# Patient Record
Sex: MALE | Race: WHITE | NOT HISPANIC OR LATINO | URBAN - METROPOLITAN AREA
[De-identification: names, ages, dates, MRNs, and addresses within clinical notes are randomized per-mention and may not be internally consistent; named-entity substitution may affect disease eponyms.]

---

## 2018-06-25 NOTE — PATIENT DISCUSSION
[facility] PLAQUE OS AFTER YARD MATERIAL FB SITTING IN EYE FOR 4 DAYS. NO FB SEEN TODAY. WILL PRESCRIBE BESIVANCE OS 6 TIMES A DAY AND CONTINUE ERYTHROMYCIN OINTMENT AT BEDTIME. SEND TO [facility] FOR CONCERN OF FUNGAL INFECTION.

## 2018-06-25 NOTE — PATIENT DISCUSSION
New Prescription: Besivance (besifloxacin): drops,suspension: 0.6% 1 drop as directed into left eye 06-

## 2018-07-09 ENCOUNTER — IMPORTED ENCOUNTER (OUTPATIENT)
Dept: URBAN - METROPOLITAN AREA CLINIC 50 | Facility: CLINIC | Age: 73
End: 2018-07-09

## 2021-04-07 ENCOUNTER — IMPORTED ENCOUNTER (OUTPATIENT)
Dept: URBAN - METROPOLITAN AREA CLINIC 50 | Facility: CLINIC | Age: 76
End: 2021-04-07

## 2021-04-07 ENCOUNTER — PREPPED CHART (OUTPATIENT)
Dept: URBAN - METROPOLITAN AREA CLINIC 49 | Facility: CLINIC | Age: 76
End: 2021-04-07

## 2021-04-07 NOTE — PATIENT DISCUSSION
Dermatochalasis RUL bothersome to patient. Recommend ptosis visual field and photos at patient's convenience for possible blepharoplasty. Patient will be contacted with results. + ASA 81mg, can stop on his own. Routing sheet on file.

## 2021-04-07 NOTE — PATIENT DISCUSSION
Informed patient that their cataract is visually significant and meets the criteria for cataract surgery to increase their vision and decrease their glare symptoms. RBALs of surgery discussed, questions answered but prefers to wait to schedule surgery.

## 2021-04-16 ASSESSMENT — VISUAL ACUITY
OS_SC: 20/30+2
OD_GLARE: 20/100
OS_GLARE: >20/400
OD_SC: 20/20-3
OU_CC: J1+
OD_GLARE: 20/400

## 2021-04-16 ASSESSMENT — TONOMETRY
OS_IOP_MMHG: 14
OD_IOP_MMHG: 14

## 2021-04-17 ASSESSMENT — VISUAL ACUITY
OS_CC: J1+(-1)
OS_OTHER: >20/400.
OD_CC: J1+(-1)
OD_BAT: 20/100
OS_SC: 20/30+2
OD_BAT: 20/60
OS_CC: J1@ 16 IN
OS_BAT: 20/200
OD_OTHER: 20/100. 20/400.
OS_PH: 20/25-2
OS_PH: 20/25
OS_OTHER: 20/200.
OS_SC: 20/40
OD_CC: J1@ 16 IN
OS_BAT: >20/400
OD_SC: 20/20-3
OD_SC: 20/20-2
OD_OTHER: 20/60.

## 2021-04-17 ASSESSMENT — TONOMETRY
OD_IOP_MMHG: 16
OS_IOP_MMHG: 14
OS_IOP_MMHG: 14
OD_IOP_MMHG: 14

## 2021-04-20 ENCOUNTER — PREPPED CHART (OUTPATIENT)
Dept: URBAN - METROPOLITAN AREA CLINIC 49 | Facility: CLINIC | Age: 76
End: 2021-04-20

## 2021-05-04 ENCOUNTER — VISUAL FIELD BLEPHAROPLASTY (OUTPATIENT)
Dept: URBAN - METROPOLITAN AREA CLINIC 49 | Facility: CLINIC | Age: 76
End: 2021-05-04

## 2021-05-04 DIAGNOSIS — H02.834: ICD-10-CM

## 2021-05-04 DIAGNOSIS — H02.831: ICD-10-CM

## 2021-05-04 PROCEDURE — 92082 INTERMEDIATE VISUAL FIELD XM: CPT

## 2021-05-04 PROCEDURE — 92285 EXTERNAL OCULAR PHOTOGRAPHY: CPT

## 2021-06-09 ENCOUNTER — BLEPH PRE-OP (OUTPATIENT)
Dept: URBAN - METROPOLITAN AREA CLINIC 49 | Facility: CLINIC | Age: 76
End: 2021-06-09

## 2021-06-09 VITALS — HEIGHT: 60 IN | DIASTOLIC BLOOD PRESSURE: 91 MMHG | SYSTOLIC BLOOD PRESSURE: 148 MMHG

## 2021-06-09 DIAGNOSIS — H02.831: ICD-10-CM

## 2021-06-09 DIAGNOSIS — H02.834: ICD-10-CM

## 2021-06-09 PROCEDURE — PREOP PRE OP VISIT

## 2021-06-09 ASSESSMENT — VISUAL ACUITY
OD_SC: 20/25-1
OS_SC: 20/25-2

## 2021-06-15 ENCOUNTER — SURGERY/PROCEDURE (OUTPATIENT)
Dept: URBAN - METROPOLITAN AREA SURGERY 16 | Facility: SURGERY | Age: 76
End: 2021-06-15

## 2021-06-15 DIAGNOSIS — H02.834: ICD-10-CM

## 2021-06-15 DIAGNOSIS — H02.831: ICD-10-CM

## 2021-06-15 PROCEDURE — 15823 BLEPHARP UPR EYELID XCSV SKN: CPT

## 2021-06-15 RX ORDER — ERYTHROMYCIN 5 MG/G: 1/2 OINTMENT OPHTHALMIC

## 2021-06-23 ENCOUNTER — 1 WEEK POST-OP (OUTPATIENT)
Dept: URBAN - METROPOLITAN AREA CLINIC 49 | Facility: CLINIC | Age: 76
End: 2021-06-23

## 2021-06-23 DIAGNOSIS — Z98.890: ICD-10-CM

## 2021-06-23 PROCEDURE — 99024 POSTOP FOLLOW-UP VISIT: CPT

## 2021-06-23 ASSESSMENT — VISUAL ACUITY
OD_SC: 20/25
OS_SC: 20/30+2

## 2021-07-19 ENCOUNTER — 4 WEEK PO - BLEPHAROPLASTY (OUTPATIENT)
Dept: URBAN - METROPOLITAN AREA CLINIC 49 | Facility: CLINIC | Age: 76
End: 2021-07-19

## 2021-07-19 DIAGNOSIS — Z98.890: ICD-10-CM

## 2021-07-19 PROCEDURE — 99024 POSTOP FOLLOW-UP VISIT: CPT

## 2021-07-19 PROCEDURE — 92285 EXTERNAL OCULAR PHOTOGRAPHY: CPT

## 2021-07-19 ASSESSMENT — VISUAL ACUITY
OD_SC: 20/25
OS_SC: 20/25

## 2021-11-30 ENCOUNTER — CATARACT EVALUATION (OUTPATIENT)
Dept: URBAN - METROPOLITAN AREA CLINIC 49 | Facility: CLINIC | Age: 76
End: 2021-11-30

## 2021-11-30 DIAGNOSIS — H25.813: ICD-10-CM

## 2021-11-30 PROCEDURE — 92014 COMPRE OPH EXAM EST PT 1/>: CPT

## 2021-11-30 ASSESSMENT — TONOMETRY
OD_IOP_MMHG: 14
OS_IOP_MMHG: 14

## 2021-11-30 ASSESSMENT — VISUAL ACUITY
OU_CC: J1+-2
OS_GLARE: 20/400
OS_GLARE: 20/80
OD_GLARE: 20/100
OD_GLARE: 20/60
OS_SC: 20/30
OD_SC: 20/25

## 2021-11-30 NOTE — PATIENT DISCUSSION
VISUALLY SIGNIFICANT: Informed patient that their cataract is visually significant and that surgery is recommended to improve patient's visual acuity and/or glare symptoms. RBAs of surgery discussed and questions answered. Patient to schedule biometry measurements and surgery. Recommend OS then OD.

## 2021-12-17 ENCOUNTER — DIAGNOSTICS ONLY (OUTPATIENT)
Dept: URBAN - METROPOLITAN AREA CLINIC 49 | Facility: CLINIC | Age: 76
End: 2021-12-17

## 2021-12-17 DIAGNOSIS — H25.813: ICD-10-CM

## 2021-12-17 DIAGNOSIS — H25.13: ICD-10-CM

## 2021-12-17 PROCEDURE — 92136 OPHTHALMIC BIOMETRY: CPT

## 2021-12-17 PROCEDURE — 92025IOL CORNEAL TOPOGRAPHY PREMIUM IOL

## 2021-12-17 ASSESSMENT — KERATOMETRY
OS_AXISANGLE2_DEGREES: 62
OS_K2POWER_DIOPTERS: 44.62
OS_AXISANGLE_DEGREES: 152
OS_K1POWER_DIOPTERS: 45.50
OD_K2POWER_DIOPTERS: 45.25
OD_AXISANGLE_DEGREES: 50
OD_K1POWER_DIOPTERS: 45.87
OD_AXISANGLE2_DEGREES: 140

## 2021-12-21 ENCOUNTER — PRE-OP/H&P (OUTPATIENT)
Dept: URBAN - METROPOLITAN AREA CLINIC 49 | Facility: CLINIC | Age: 76
End: 2021-12-21

## 2021-12-21 VITALS — SYSTOLIC BLOOD PRESSURE: 143 MMHG | DIASTOLIC BLOOD PRESSURE: 90 MMHG | HEIGHT: 60 IN | HEART RATE: 62 BPM

## 2021-12-21 DIAGNOSIS — H43.393: ICD-10-CM

## 2021-12-21 DIAGNOSIS — H25.812: ICD-10-CM

## 2021-12-21 PROCEDURE — 92134 CPTRZ OPH DX IMG PST SGM RTA: CPT

## 2021-12-21 PROCEDURE — PREOP PRE OP VISIT

## 2021-12-21 ASSESSMENT — KERATOMETRY
OD_K2POWER_DIOPTERS: 45.25
OD_K1POWER_DIOPTERS: 45.87
OS_K1POWER_DIOPTERS: 45.50
OS_K2POWER_DIOPTERS: 44.62
OS_AXISANGLE_DEGREES: 152
OD_AXISANGLE2_DEGREES: 140
OD_AXISANGLE_DEGREES: 50
OS_AXISANGLE2_DEGREES: 62

## 2021-12-21 ASSESSMENT — TONOMETRY
OS_IOP_MMHG: 14
OD_IOP_MMHG: 14

## 2021-12-21 ASSESSMENT — VISUAL ACUITY
OD_SC: 20/30
OS_SC: 20/30

## 2021-12-21 NOTE — PATIENT DISCUSSION
CATARACT SURGERY PLANNER - STANDARD IOL/+FEMTO: Phacoemulsification with IOL: Eye: OS|DOS: 1/6/2022|Model: DIBOO|Power: 20. 5|Target: PLANO|Femto: YES|Arcs: 44° @ 150° ; 44° @ 330°|Visc: DUET|Omidria: YES|10% Phenylephrine: YES|Epi-shugarcaine: YES|Phaco Setting: STD|Pupilloplasty: +/-|Notes: PLAN: EYHANCE @ [facility] OU. HX: NO MAC PATHOLOGY. DILATED PUPIL: 5MM.

## 2022-01-06 ENCOUNTER — POST-OP (OUTPATIENT)
Dept: URBAN - METROPOLITAN AREA CLINIC 48 | Facility: CLINIC | Age: 77
End: 2022-01-06

## 2022-01-06 ENCOUNTER — SURGERY/PROCEDURE (OUTPATIENT)
Dept: URBAN - METROPOLITAN AREA SURGERY 16 | Facility: SURGERY | Age: 77
End: 2022-01-06

## 2022-01-06 DIAGNOSIS — Z98.42: ICD-10-CM

## 2022-01-06 DIAGNOSIS — H25.812: ICD-10-CM

## 2022-01-06 PROCEDURE — 99024 POSTOP FOLLOW-UP VISIT: CPT

## 2022-01-06 PROCEDURE — 66984 XCAPSL CTRC RMVL W/O ECP: CPT

## 2022-01-06 ASSESSMENT — KERATOMETRY
OD_AXISANGLE_DEGREES: 50
OS_AXISANGLE2_DEGREES: 62
OS_K1POWER_DIOPTERS: 45.50
OD_K2POWER_DIOPTERS: 45.25
OS_AXISANGLE_DEGREES: 152
OD_K1POWER_DIOPTERS: 45.87
OS_AXISANGLE_DEGREES: 152
OD_AXISANGLE2_DEGREES: 140
OD_AXISANGLE_DEGREES: 50
OS_K2POWER_DIOPTERS: 44.62
OS_K2POWER_DIOPTERS: 44.62
OD_AXISANGLE2_DEGREES: 140
OD_K2POWER_DIOPTERS: 45.25
OS_AXISANGLE2_DEGREES: 62
OD_K1POWER_DIOPTERS: 45.87
OS_K1POWER_DIOPTERS: 45.50

## 2022-01-06 ASSESSMENT — VISUAL ACUITY: OS_SC: 20/50

## 2022-01-06 ASSESSMENT — TONOMETRY: OS_IOP_MMHG: 22

## 2022-01-11 ENCOUNTER — POST-OP (OUTPATIENT)
Dept: URBAN - METROPOLITAN AREA CLINIC 49 | Facility: CLINIC | Age: 77
End: 2022-01-11

## 2022-01-11 DIAGNOSIS — H25.811: ICD-10-CM

## 2022-01-11 DIAGNOSIS — Z98.42: ICD-10-CM

## 2022-01-11 PROCEDURE — 99024 POSTOP FOLLOW-UP VISIT: CPT

## 2022-01-11 ASSESSMENT — TONOMETRY
OD_IOP_MMHG: 11
OS_IOP_MMHG: 11

## 2022-01-11 ASSESSMENT — VISUAL ACUITY
OS_SC: J5@17"
OS_SC: 20/20-2
OD_SC: 20/25
OS_SC: J5@26"

## 2022-07-25 ENCOUNTER — COMPREHENSIVE EXAM (OUTPATIENT)
Dept: URBAN - METROPOLITAN AREA CLINIC 49 | Facility: CLINIC | Age: 77
End: 2022-07-25

## 2022-07-25 DIAGNOSIS — H25.811: ICD-10-CM

## 2022-07-25 DIAGNOSIS — Z96.1: ICD-10-CM

## 2022-07-25 DIAGNOSIS — H43.393: ICD-10-CM

## 2022-07-25 DIAGNOSIS — H26.492: ICD-10-CM

## 2022-07-25 PROCEDURE — 92014 COMPRE OPH EXAM EST PT 1/>: CPT

## 2022-07-25 ASSESSMENT — VISUAL ACUITY
OS_GLARE: 20/40-1
OS_SC: 20/50-1
OU_SC: J1
OD_GLARE: 20/25-1
OS_SC: 20/25-2
OD_SC: 20/30-1/+1
OS_SC: J3-1
OS_GLARE: 20/30-1
OD_GLARE: 20/25-1

## 2022-07-25 ASSESSMENT — TONOMETRY
OS_IOP_MMHG: 11
OD_IOP_MMHG: 11

## 2022-07-25 NOTE — PATIENT DISCUSSION
Encouraged patient to eat dark leafy green vegetables and egg yolks to help prevent against macular degeneration.

## 2023-07-31 ENCOUNTER — COMPREHENSIVE EXAM (OUTPATIENT)
Dept: URBAN - METROPOLITAN AREA CLINIC 49 | Facility: CLINIC | Age: 78
End: 2023-07-31

## 2023-07-31 DIAGNOSIS — H52.4: ICD-10-CM

## 2023-07-31 DIAGNOSIS — Z01.01: ICD-10-CM

## 2023-07-31 PROCEDURE — 92015 DETERMINE REFRACTIVE STATE: CPT

## 2023-07-31 PROCEDURE — 92014 COMPRE OPH EXAM EST PT 1/>: CPT

## 2023-07-31 ASSESSMENT — VISUAL ACUITY
OD_GLARE: 20/40
OD_SC: 20/20-1
OS_SC: 20/30-2/+2
OS_PH: 20/30+1
OS_GLARE: 20/25-1
OU_SC: J1 @ 15IN
OD_GLARE: 20/40
OS_GLARE: 20/25-1

## 2023-07-31 ASSESSMENT — TONOMETRY
OS_IOP_MMHG: 11
OD_IOP_MMHG: 11

## 2024-08-07 ENCOUNTER — COMPREHENSIVE EXAM (OUTPATIENT)
Dept: URBAN - METROPOLITAN AREA CLINIC 49 | Facility: LOCATION | Age: 79
End: 2024-08-07

## 2024-08-07 DIAGNOSIS — Z01.01: ICD-10-CM

## 2024-08-07 DIAGNOSIS — H52.4: ICD-10-CM

## 2024-08-07 PROCEDURE — 92014 COMPRE OPH EXAM EST PT 1/>: CPT

## 2024-08-07 PROCEDURE — 92015 DETERMINE REFRACTIVE STATE: CPT

## 2024-08-07 ASSESSMENT — VISUAL ACUITY
OD_GLARE: 20/50+2
OU_SC: J2@16"
OD_GLARE: 20/25
OS_PH: 20/40+2
OS_GLARE: 20/30
OD_SC: 20/25
OS_GLARE: 20/40
OS_SC: 20/50

## 2024-08-07 ASSESSMENT — TONOMETRY
OS_IOP_MMHG: 10
OD_IOP_MMHG: 12

## 2025-03-18 NOTE — PATIENT DISCUSSION
A complete history and physical was performed today.  Vaccines are up to date except for Tdap which pt is to complete at her local pharmacy.   Aged out of screening exams including colonoscopy, mammogram, and PAP smear.   Pt to complete labs previously ordered by Cardiology as indicated.   No new labs ordered today.      Post op instructions reviewed with patients including the use of drops.